# Patient Record
Sex: MALE
[De-identification: names, ages, dates, MRNs, and addresses within clinical notes are randomized per-mention and may not be internally consistent; named-entity substitution may affect disease eponyms.]

---

## 2017-01-13 ENCOUNTER — CHARTING TRANS (OUTPATIENT)
Dept: OTHER | Age: 67
End: 2017-01-13

## 2017-05-24 ENCOUNTER — CHARTING TRANS (OUTPATIENT)
Dept: OTHER | Age: 67
End: 2017-05-24

## 2017-08-17 ENCOUNTER — CHARTING TRANS (OUTPATIENT)
Dept: OTHER | Age: 67
End: 2017-08-17

## 2018-01-19 PROBLEM — N40.0 BENIGN PROSTATIC HYPERPLASIA WITHOUT LOWER URINARY TRACT SYMPTOMS: Status: ACTIVE | Noted: 2018-01-19

## 2018-01-19 PROBLEM — I10 ESSENTIAL (PRIMARY) HYPERTENSION: Status: ACTIVE | Noted: 2018-01-19

## 2018-01-19 PROBLEM — E78.00 HYPERCHOLESTEROLEMIA: Status: ACTIVE | Noted: 2018-01-19

## 2018-01-19 PROBLEM — E11.9 TYPE 2 DIABETES MELLITUS WITHOUT COMPLICATION, WITHOUT LONG-TERM CURRENT USE OF INSULIN (HCC): Status: ACTIVE | Noted: 2018-01-19

## 2018-03-22 PROCEDURE — 81003 URINALYSIS AUTO W/O SCOPE: CPT | Performed by: INTERNAL MEDICINE

## 2018-07-03 PROBLEM — L23.7 POISON IVY DERMATITIS: Status: ACTIVE | Noted: 2018-07-03

## 2018-07-31 PROCEDURE — 82043 UR ALBUMIN QUANTITATIVE: CPT | Performed by: INTERNAL MEDICINE

## 2018-07-31 PROCEDURE — 82570 ASSAY OF URINE CREATININE: CPT | Performed by: INTERNAL MEDICINE

## 2018-08-20 ENCOUNTER — CHARTING TRANS (OUTPATIENT)
Dept: OTHER | Age: 68
End: 2018-08-20

## 2018-11-03 VITALS
HEART RATE: 63 BPM | RESPIRATION RATE: 16 BRPM | TEMPERATURE: 98.3 F | WEIGHT: 190.56 LBS | DIASTOLIC BLOOD PRESSURE: 73 MMHG | BODY MASS INDEX: 28.23 KG/M2 | HEIGHT: 69 IN | SYSTOLIC BLOOD PRESSURE: 108 MMHG

## 2018-11-03 VITALS
DIASTOLIC BLOOD PRESSURE: 83 MMHG | WEIGHT: 189.13 LBS | RESPIRATION RATE: 16 BRPM | BODY MASS INDEX: 30.4 KG/M2 | SYSTOLIC BLOOD PRESSURE: 120 MMHG | TEMPERATURE: 98 F | HEIGHT: 66 IN | HEART RATE: 71 BPM

## 2018-11-05 VITALS
DIASTOLIC BLOOD PRESSURE: 84 MMHG | SYSTOLIC BLOOD PRESSURE: 148 MMHG | WEIGHT: 188 LBS | HEIGHT: 66 IN | HEART RATE: 76 BPM | BODY MASS INDEX: 30.22 KG/M2 | RESPIRATION RATE: 18 BRPM | TEMPERATURE: 97.8 F

## 2018-11-27 VITALS
BODY MASS INDEX: 29.82 KG/M2 | HEIGHT: 66 IN | RESPIRATION RATE: 16 BRPM | WEIGHT: 185.52 LBS | HEART RATE: 88 BPM | TEMPERATURE: 98.7 F | DIASTOLIC BLOOD PRESSURE: 78 MMHG | SYSTOLIC BLOOD PRESSURE: 108 MMHG

## 2019-04-09 ENCOUNTER — TELEPHONE (OUTPATIENT)
Dept: SCHEDULING | Age: 69
End: 2019-04-09

## 2019-08-27 PROBLEM — R97.20 ELEVATED PSA: Status: ACTIVE | Noted: 2019-08-27

## 2019-10-24 PROBLEM — M75.51 BURSITIS OF RIGHT SHOULDER: Status: ACTIVE | Noted: 2019-10-24

## 2020-08-09 LAB — AMB EXT COVID-19 RESULT: DETECTED

## 2020-08-14 ENCOUNTER — APPOINTMENT (OUTPATIENT)
Dept: GENERAL RADIOLOGY | Facility: HOSPITAL | Age: 70
DRG: 177 | End: 2020-08-14
Attending: EMERGENCY MEDICINE
Payer: MEDICARE

## 2020-08-14 ENCOUNTER — HOSPITAL ENCOUNTER (INPATIENT)
Facility: HOSPITAL | Age: 70
LOS: 7 days | Discharge: HOME | DRG: 177 | End: 2020-08-21
Attending: EMERGENCY MEDICINE | Admitting: INTERNAL MEDICINE
Payer: MEDICARE

## 2020-08-14 DIAGNOSIS — U07.1 COVID-19 VIRUS INFECTION: Primary | ICD-10-CM

## 2020-08-14 LAB
ALBUMIN SERPL-MCNC: 2.8 G/DL (ref 3.4–5)
ALBUMIN/GLOB SERPL: 0.7 {RATIO} (ref 1–2)
ALLENS TEST: POSITIVE
ALP LIVER SERPL-CCNC: 77 U/L (ref 45–117)
ALT SERPL-CCNC: 24 U/L (ref 16–61)
ANION GAP SERPL CALC-SCNC: 10 MMOL/L (ref 0–18)
ANTIBODY SCREEN: NEGATIVE
ARTERIAL BLD GAS O2 SATURATION: 94 % (ref 92–100)
ARTERIAL BLOOD GAS BASE EXCESS: -2.2 MMOL/L (ref ?–2)
ARTERIAL BLOOD GAS HCO3: 21.1 MEQ/L (ref 22–26)
ARTERIAL BLOOD GAS PCO2: 33 MM HG (ref 35–45)
ARTERIAL BLOOD GAS PH: 7.42 (ref 7.35–7.45)
ARTERIAL BLOOD GAS PO2: 74 MM HG (ref 80–105)
AST SERPL-CCNC: 20 U/L (ref 15–37)
BASOPHILS # BLD AUTO: 0.01 X10(3) UL (ref 0–0.2)
BASOPHILS NFR BLD AUTO: 0.1 %
BILIRUB SERPL-MCNC: 0.7 MG/DL (ref 0.1–2)
BILIRUB UR QL STRIP.AUTO: NEGATIVE
BUN BLD-MCNC: 12 MG/DL (ref 7–18)
BUN/CREAT SERPL: 13.3 (ref 10–20)
CALCIUM BLD-MCNC: 9 MG/DL (ref 8.5–10.1)
CALCULATED O2 SATURATION: 94 % (ref 92–100)
CARBOXYHEMOGLOBIN: 1.1 % SAT (ref 0–3)
CHLORIDE SERPL-SCNC: 98 MMOL/L (ref 98–112)
CK SERPL-CCNC: 142 U/L (ref 39–308)
CLARITY UR REFRACT.AUTO: CLEAR
CO2 SERPL-SCNC: 21 MMOL/L (ref 21–32)
COLOR UR AUTO: YELLOW
CREAT BLD-MCNC: 0.9 MG/DL (ref 0.7–1.3)
CRP SERPL-MCNC: 31.1 MG/DL (ref ?–0.3)
D-DIMER: 1.73 UG/ML FEU (ref ?–0.69)
DEPRECATED HBV CORE AB SER IA-ACNC: 143.6 NG/ML (ref 30–530)
DEPRECATED RDW RBC AUTO: 40.9 FL (ref 35.1–46.3)
EOSINOPHIL # BLD AUTO: 0 X10(3) UL (ref 0–0.7)
EOSINOPHIL NFR BLD AUTO: 0 %
ERYTHROCYTE [DISTWIDTH] IN BLOOD BY AUTOMATED COUNT: 12.3 % (ref 11–15)
GLOBULIN PLAS-MCNC: 4.3 G/DL (ref 2.8–4.4)
GLUCOSE BLD-MCNC: 305 MG/DL (ref 70–99)
GLUCOSE BLD-MCNC: 309 MG/DL (ref 70–99)
GLUCOSE BLD-MCNC: 344 MG/DL (ref 70–99)
GLUCOSE UR STRIP.AUTO-MCNC: >=500 MG/DL
HCT VFR BLD AUTO: 37.6 % (ref 39–53)
HGB BLD-MCNC: 12.9 G/DL (ref 13–17.5)
IMM GRANULOCYTES # BLD AUTO: 0.02 X10(3) UL (ref 0–1)
IMM GRANULOCYTES NFR BLD: 0.2 %
IONIZED CALCIUM: 1.18 MMOL/L (ref 1.12–1.32)
KETONES UR STRIP.AUTO-MCNC: 20 MG/DL
L/M: 15 L/MIN
LACTIC ACID ARTERIAL: 2.4 MMOL/L (ref 0.5–2)
LDH SERPL L TO P-CCNC: 207 U/L
LEUKOCYTE ESTERASE UR QL STRIP.AUTO: NEGATIVE
LYMPHOCYTES # BLD AUTO: 0.73 X10(3) UL (ref 1–4)
LYMPHOCYTES NFR BLD AUTO: 6.6 %
M PROTEIN MFR SERPL ELPH: 7.1 G/DL (ref 6.4–8.2)
MCH RBC QN AUTO: 31.2 PG (ref 26–34)
MCHC RBC AUTO-ENTMCNC: 34.3 G/DL (ref 31–37)
MCV RBC AUTO: 90.8 FL (ref 80–100)
METHEMOGLOBIN: 0.6 % SAT (ref 0.4–1.5)
MONOCYTES # BLD AUTO: 0.51 X10(3) UL (ref 0.1–1)
MONOCYTES NFR BLD AUTO: 4.6 %
NEUTROPHILS # BLD AUTO: 9.78 X10 (3) UL (ref 1.5–7.7)
NEUTROPHILS # BLD AUTO: 9.78 X10(3) UL (ref 1.5–7.7)
NEUTROPHILS NFR BLD AUTO: 88.5 %
NITRITE UR QL STRIP.AUTO: NEGATIVE
NT-PROBNP SERPL-MCNC: 367 PG/ML (ref ?–125)
OSMOLALITY SERPL CALC.SUM OF ELEC: 279 MOSM/KG (ref 275–295)
PATIENT TEMPERATURE: 100.2 F
PH UR STRIP.AUTO: 5 [PH] (ref 4.5–8)
PLATELET # BLD AUTO: 184 10(3)UL (ref 150–450)
POTASSIUM BLOOD GAS: 4.1 MMOL/L (ref 3.6–5.1)
POTASSIUM SERPL-SCNC: 4.1 MMOL/L (ref 3.5–5.1)
PROCALCITONIN SERPL-MCNC: 0.36 NG/ML (ref ?–0.16)
PROT UR STRIP.AUTO-MCNC: 100 MG/DL
RBC # BLD AUTO: 4.14 X10(6)UL (ref 3.8–5.8)
RH BLOOD TYPE: POSITIVE
SODIUM BLOOD GAS: 132 MMOL/L (ref 136–144)
SODIUM SERPL-SCNC: 129 MMOL/L (ref 136–145)
SP GR UR STRIP.AUTO: 1.03 (ref 1–1.03)
TOTAL HEMOGLOBIN: 13.6 G/DL (ref 12.6–17.4)
TROPONIN I SERPL-MCNC: <0.045 NG/ML (ref ?–0.04)
TROPONIN I SERPL-MCNC: <0.045 NG/ML (ref ?–0.04)
UROBILINOGEN UR STRIP.AUTO-MCNC: <2 MG/DL
WBC # BLD AUTO: 11.1 X10(3) UL (ref 4–11)

## 2020-08-14 PROCEDURE — 3E0333Z INTRODUCTION OF ANTI-INFLAMMATORY INTO PERIPHERAL VEIN, PERCUTANEOUS APPROACH: ICD-10-PCS | Performed by: EMERGENCY MEDICINE

## 2020-08-14 PROCEDURE — 71045 X-RAY EXAM CHEST 1 VIEW: CPT | Performed by: EMERGENCY MEDICINE

## 2020-08-14 PROCEDURE — XW033E5 INTRODUCTION OF REMDESIVIR ANTI-INFECTIVE INTO PERIPHERAL VEIN, PERCUTANEOUS APPROACH, NEW TECHNOLOGY GROUP 5: ICD-10-PCS | Performed by: INTERNAL MEDICINE

## 2020-08-14 PROCEDURE — 99291 CRITICAL CARE FIRST HOUR: CPT | Performed by: HOSPITALIST

## 2020-08-14 RX ORDER — AMLODIPINE BESYLATE 10 MG/1
40 TABLET ORAL DAILY
COMMUNITY
End: 2020-08-14 | Stop reason: CLARIF

## 2020-08-14 RX ORDER — ASCORBIC ACID 500 MG
500 TABLET ORAL 2 TIMES DAILY
Status: DISCONTINUED | OUTPATIENT
Start: 2020-08-14 | End: 2020-08-21

## 2020-08-14 RX ORDER — METOCLOPRAMIDE HYDROCHLORIDE 5 MG/ML
10 INJECTION INTRAMUSCULAR; INTRAVENOUS EVERY 8 HOURS PRN
Status: DISCONTINUED | OUTPATIENT
Start: 2020-08-14 | End: 2020-08-21

## 2020-08-14 RX ORDER — ONDANSETRON 2 MG/ML
4 INJECTION INTRAMUSCULAR; INTRAVENOUS EVERY 6 HOURS PRN
Status: DISCONTINUED | OUTPATIENT
Start: 2020-08-14 | End: 2020-08-21

## 2020-08-14 RX ORDER — TAMSULOSIN HYDROCHLORIDE 0.4 MG/1
0.8 CAPSULE ORAL DAILY
COMMUNITY

## 2020-08-14 RX ORDER — POLYETHYLENE GLYCOL 3350 17 G/17G
17 POWDER, FOR SOLUTION ORAL DAILY PRN
Status: DISCONTINUED | OUTPATIENT
Start: 2020-08-14 | End: 2020-08-21

## 2020-08-14 RX ORDER — DEXAMETHASONE SODIUM PHOSPHATE 4 MG/ML
6 VIAL (ML) INJECTION DAILY
Status: DISCONTINUED | OUTPATIENT
Start: 2020-08-15 | End: 2020-08-16

## 2020-08-14 RX ORDER — ENOXAPARIN SODIUM 100 MG/ML
40 INJECTION SUBCUTANEOUS DAILY
Status: DISCONTINUED | OUTPATIENT
Start: 2020-08-14 | End: 2020-08-15

## 2020-08-14 RX ORDER — SODIUM PHOSPHATE, DIBASIC AND SODIUM PHOSPHATE, MONOBASIC 7; 19 G/133ML; G/133ML
1 ENEMA RECTAL ONCE AS NEEDED
Status: DISCONTINUED | OUTPATIENT
Start: 2020-08-14 | End: 2020-08-21

## 2020-08-14 RX ORDER — AMLODIPINE BESYLATE AND BENAZEPRIL HYDROCHLORIDE 5; 40 MG/1; MG/1
1 CAPSULE ORAL DAILY
COMMUNITY
End: 2020-10-07 | Stop reason: CLARIF

## 2020-08-14 RX ORDER — ACETAMINOPHEN 325 MG/1
650 TABLET ORAL EVERY 6 HOURS PRN
Status: DISCONTINUED | OUTPATIENT
Start: 2020-08-14 | End: 2020-08-21

## 2020-08-14 RX ORDER — ENOXAPARIN SODIUM 100 MG/ML
40 INJECTION SUBCUTANEOUS DAILY
Status: DISCONTINUED | OUTPATIENT
Start: 2020-08-15 | End: 2020-08-14

## 2020-08-14 RX ORDER — DEXTROSE MONOHYDRATE 25 G/50ML
50 INJECTION, SOLUTION INTRAVENOUS
Status: DISCONTINUED | OUTPATIENT
Start: 2020-08-14 | End: 2020-08-21

## 2020-08-14 RX ORDER — BISACODYL 10 MG
10 SUPPOSITORY, RECTAL RECTAL
Status: DISCONTINUED | OUTPATIENT
Start: 2020-08-14 | End: 2020-08-21

## 2020-08-14 RX ORDER — ERGOCALCIFEROL 1.25 MG/1
50000 CAPSULE ORAL
Status: DISCONTINUED | OUTPATIENT
Start: 2020-08-14 | End: 2020-08-21

## 2020-08-14 RX ORDER — DEXAMETHASONE SODIUM PHOSPHATE 4 MG/ML
6 VIAL (ML) INJECTION ONCE
Status: COMPLETED | OUTPATIENT
Start: 2020-08-14 | End: 2020-08-14

## 2020-08-14 NOTE — ED INITIAL ASSESSMENT (HPI)
Pt arrives from [facility] immediate care for worsening COVID 19 symptoms ~ dx was positive on Sunday ~ pt reports increasing shortness of breath and reports that oxygen saturation was 88% on room air there which came up to 92-95% with 2L of oxygen.  Pt

## 2020-08-15 ENCOUNTER — APPOINTMENT (OUTPATIENT)
Dept: ULTRASOUND IMAGING | Facility: HOSPITAL | Age: 70
DRG: 177 | End: 2020-08-15
Attending: INTERNAL MEDICINE
Payer: MEDICARE

## 2020-08-15 LAB
ALBUMIN SERPL-MCNC: 2.4 G/DL (ref 3.4–5)
ALBUMIN/GLOB SERPL: 0.6 {RATIO} (ref 1–2)
ALP LIVER SERPL-CCNC: 74 U/L (ref 45–117)
ALT SERPL-CCNC: 27 U/L (ref 16–61)
ANION GAP SERPL CALC-SCNC: 6 MMOL/L (ref 0–18)
AST SERPL-CCNC: 20 U/L (ref 15–37)
BASOPHILS # BLD AUTO: 0.01 X10(3) UL (ref 0–0.2)
BASOPHILS NFR BLD AUTO: 0.1 %
BILIRUB SERPL-MCNC: 0.4 MG/DL (ref 0.1–2)
BUN BLD-MCNC: 16 MG/DL (ref 7–18)
BUN/CREAT SERPL: 20.5 (ref 10–20)
CALCIUM BLD-MCNC: 8.6 MG/DL (ref 8.5–10.1)
CHLORIDE SERPL-SCNC: 103 MMOL/L (ref 98–112)
CO2 SERPL-SCNC: 25 MMOL/L (ref 21–32)
CREAT BLD-MCNC: 0.78 MG/DL (ref 0.7–1.3)
CRP SERPL-MCNC: 32.5 MG/DL (ref ?–0.3)
D-DIMER: 1.71 UG/ML FEU (ref ?–0.69)
DEPRECATED HBV CORE AB SER IA-ACNC: 234.5 NG/ML (ref 30–530)
DEPRECATED RDW RBC AUTO: 41.2 FL (ref 35.1–46.3)
EOSINOPHIL # BLD AUTO: 0 X10(3) UL (ref 0–0.7)
EOSINOPHIL NFR BLD AUTO: 0 %
ERYTHROCYTE [DISTWIDTH] IN BLOOD BY AUTOMATED COUNT: 12.4 % (ref 11–15)
EST. AVERAGE GLUCOSE BLD GHB EST-MCNC: 229 MG/DL (ref 68–126)
GLOBULIN PLAS-MCNC: 4.3 G/DL (ref 2.8–4.4)
GLUCOSE BLD-MCNC: 293 MG/DL (ref 70–99)
GLUCOSE BLD-MCNC: 293 MG/DL (ref 70–99)
GLUCOSE BLD-MCNC: 302 MG/DL (ref 70–99)
GLUCOSE BLD-MCNC: 309 MG/DL (ref 70–99)
GLUCOSE BLD-MCNC: 314 MG/DL (ref 70–99)
HAV IGM SER QL: 2.1 MG/DL (ref 1.6–2.6)
HBA1C MFR BLD HPLC: 9.6 % (ref ?–5.7)
HCT VFR BLD AUTO: 35.8 % (ref 39–53)
HGB BLD-MCNC: 12.2 G/DL (ref 13–17.5)
IMM GRANULOCYTES # BLD AUTO: 0.04 X10(3) UL (ref 0–1)
IMM GRANULOCYTES NFR BLD: 0.4 %
LDH SERPL L TO P-CCNC: 222 U/L
LYMPHOCYTES # BLD AUTO: 0.56 X10(3) UL (ref 1–4)
LYMPHOCYTES NFR BLD AUTO: 6.1 %
M PROTEIN MFR SERPL ELPH: 6.7 G/DL (ref 6.4–8.2)
MCH RBC QN AUTO: 31.4 PG (ref 26–34)
MCHC RBC AUTO-ENTMCNC: 34.1 G/DL (ref 31–37)
MCV RBC AUTO: 92.3 FL (ref 80–100)
MONOCYTES # BLD AUTO: 0.25 X10(3) UL (ref 0.1–1)
MONOCYTES NFR BLD AUTO: 2.7 %
NEUTROPHILS # BLD AUTO: 8.31 X10 (3) UL (ref 1.5–7.7)
NEUTROPHILS # BLD AUTO: 8.31 X10(3) UL (ref 1.5–7.7)
NEUTROPHILS NFR BLD AUTO: 90.7 %
OSMOLALITY SERPL CALC.SUM OF ELEC: 290 MOSM/KG (ref 275–295)
PLATELET # BLD AUTO: 194 10(3)UL (ref 150–450)
POTASSIUM SERPL-SCNC: 4.4 MMOL/L (ref 3.5–5.1)
RBC # BLD AUTO: 3.88 X10(6)UL (ref 3.8–5.8)
SODIUM SERPL-SCNC: 134 MMOL/L (ref 136–145)
TROPONIN I SERPL-MCNC: <0.045 NG/ML (ref ?–0.04)
WBC # BLD AUTO: 9.2 X10(3) UL (ref 4–11)

## 2020-08-15 PROCEDURE — 93970 EXTREMITY STUDY: CPT | Performed by: INTERNAL MEDICINE

## 2020-08-15 PROCEDURE — XW13325 TRANSFUSION OF CONVALESCENT PLASMA (NONAUTOLOGOUS) INTO PERIPHERAL VEIN, PERCUTANEOUS APPROACH, NEW TECHNOLOGY GROUP 5: ICD-10-PCS | Performed by: HOSPITALIST

## 2020-08-15 RX ORDER — TAMSULOSIN HYDROCHLORIDE 0.4 MG/1
0.8 CAPSULE ORAL DAILY
Status: DISCONTINUED | OUTPATIENT
Start: 2020-08-15 | End: 2020-08-21

## 2020-08-15 RX ORDER — FINASTERIDE 5 MG/1
5 TABLET, FILM COATED ORAL DAILY
Status: DISCONTINUED | OUTPATIENT
Start: 2020-08-16 | End: 2020-08-21

## 2020-08-15 RX ORDER — MORPHINE SULFATE 4 MG/ML
INJECTION, SOLUTION INTRAMUSCULAR; INTRAVENOUS
Status: COMPLETED
Start: 2020-08-15 | End: 2020-08-15

## 2020-08-15 RX ORDER — BENZONATATE 100 MG/1
100 CAPSULE ORAL 3 TIMES DAILY PRN
Status: DISCONTINUED | OUTPATIENT
Start: 2020-08-15 | End: 2020-08-21

## 2020-08-15 RX ORDER — MORPHINE SULFATE 4 MG/ML
1 INJECTION, SOLUTION INTRAMUSCULAR; INTRAVENOUS EVERY 2 HOUR PRN
Status: DISCONTINUED | OUTPATIENT
Start: 2020-08-15 | End: 2020-08-21

## 2020-08-15 RX ORDER — MORPHINE SULFATE 4 MG/ML
2 INJECTION, SOLUTION INTRAMUSCULAR; INTRAVENOUS EVERY 2 HOUR PRN
Status: DISCONTINUED | OUTPATIENT
Start: 2020-08-15 | End: 2020-08-21

## 2020-08-15 RX ORDER — MORPHINE SULFATE 4 MG/ML
2 INJECTION, SOLUTION INTRAMUSCULAR; INTRAVENOUS ONCE
Status: COMPLETED | OUTPATIENT
Start: 2020-08-15 | End: 2020-08-15

## 2020-08-15 RX ORDER — ROSUVASTATIN CALCIUM 10 MG/1
10 TABLET, COATED ORAL NIGHTLY
Status: DISCONTINUED | OUTPATIENT
Start: 2020-08-15 | End: 2020-08-21

## 2020-08-15 RX ORDER — MORPHINE SULFATE 4 MG/ML
0.5 INJECTION, SOLUTION INTRAMUSCULAR; INTRAVENOUS EVERY 2 HOUR PRN
Status: DISCONTINUED | OUTPATIENT
Start: 2020-08-15 | End: 2020-08-21

## 2020-08-15 RX ORDER — TETRAHYDROZOLINE HCL 0.05 %
1 DROPS OPHTHALMIC (EYE) 4 TIMES DAILY PRN
Status: DISCONTINUED | OUTPATIENT
Start: 2020-08-15 | End: 2020-08-21

## 2020-08-15 RX ORDER — ENOXAPARIN SODIUM 100 MG/ML
1 INJECTION SUBCUTANEOUS EVERY 12 HOURS SCHEDULED
Status: DISCONTINUED | OUTPATIENT
Start: 2020-08-15 | End: 2020-08-20

## 2020-08-15 NOTE — PLAN OF CARE
Transferred from the floor to ICU at 2215H last night post RRT because of desaturation, O2 sat was at 80's %, he was just admitted from ER that time.  To ICU for airway monitoring, he is in 100% NRM when he came in, tachypneic, shallow breathing, but not in

## 2020-08-15 NOTE — PROGRESS NOTES
Pt A0x4. Maintaining 02 sats on 3L via NC. Dry nagging cough noted. Appears that coughing is made worse by talking. +SOB with coughing episodes. Robitussin/Tessalon given. VSS. Tolerating diet. Voiding freely. Denies pain. Pt declined sitting up in chair.

## 2020-08-15 NOTE — PROGRESS NOTES
RRT Note    Patient admitted for acute hypoxic respiratory failure d/t COVID19. Patient arrived to the floor on 2L and then became hypoxic. Patient placed in prone position, oxygen increased to 15L.     Patient seen at bedside, tachypneic in conversation, t

## 2020-08-15 NOTE — CONSULTS
INFECTIOUS DISEASE CONSULTATION    Amelia Pascual Patient Status:  Inpatient    1950 MRN [id number]   [facility] 4SW-A Attending Beth Pulliam MD   [facility] Day # 1 PCP Bradley Bowens, sodium chloride 0.9% 100 mL MBP/ADD-vantage, 1 g, Intravenous, Q24H  •  azithromycin (ZITHROMAX) 500 mg in sodium chloride 0.9% 250 mL IVPB, 500 mg, Intravenous, Q24H  •  dexamethasone Sodium Phosphate (DECADRON) 4 MG/ML injection 6 mg, 6 mg, Intravenous, daily., Disp: , Rfl:   tamsulosin (FLOMAX) cap, Take 0.8 mg by mouth daily. Take half hour after meal, Disp: , Rfl:   benzonatate 200 MG Oral Cap, Take 1 capsule (200 mg total) by mouth 3 (three) times daily as needed for cough. , Disp: 30 capsule, Rfl: 0 2. 8* 2.4*   * 134*   K 4.1 4.4   CL 98 103   CO2 21.0 25.0   ALKPHO 77 74   AST 20 20   ALT 24 27   BILT 0.7 0.4   TP 7.1 6.7         Lab Results   Component Value Date    DDIMER 1.71 08/15/2020    CRP 32.50 08/15/2020    MG 2.1 08/15/2020    TROP <0

## 2020-08-15 NOTE — CONSULTS
[facility] PULMONARY/CRITICAL CARE CONSULTATION       HPI: Lilly Vinson is a 71year old male with a history of DM2, HTN, hyperlipidemia  who presented to the [facility] today with cough and dyspnea for the past 8 days. Pt tested positive for COVID on 8/7.  Armando dexamethasone Sodium Phosphate (DECADRON) 4 MG/ML injection 6 mg, 6 mg, Intravenous, Once        ALLERGIES      No Known Allergies    SOCIAL HISTORY      Social History    Socioeconomic History      Marital status:       Spouse name: Not on file Resp: (!) 28 (!) 28 24 22   Temp: 99.2 °F (37.3 °C)      TempSrc: Oral      SpO2: 93% 94%     Weight: 175 lb (79.4 kg)      Height: 5' 6\" (1.676 m)          Oxygen Therapy  SpO2: 94 %  O2 Device: Nasal cannula  O2 Flow Rate (L/min): 4 L/min    Gen - Alert

## 2020-08-15 NOTE — H&P
[facility] Hospitalist H&P       CC: Patient presents with:  Dyspnea FAMILIA SOB       PCP: Vern Escobar MD    History of Present Illness:  Mr. Larry Davila is a 70 yo male with PMH of BPH, DM type II, HTN, HLD who presented to the ED with shortness of breath.  Patient 90 tablet, Rfl: 1          Soc Hx  Social History    Tobacco Use      Smoking status: Never Smoker      Smokeless tobacco: Never Used    Alcohol use: No       Fam Hx  Family History   Problem Relation Age of Onset   • Diabetes Mother    • Diabetes Brother bilateral interstitial abnormalities    Radiology: Xr Chest Ap Portable  (cpt=71045)    Result Date: 8/14/2020  PROCEDURE:  XR CHEST AP PORTABLE  (CPT=71045)  TECHNIQUE:  AP chest radiograph was obtained. COMPARISON:  None.   INDICATIONS:  increasing SOB ~ failure    # Mild Anemia  - normocytic  - monitor  - no active signs of bleeding    # DM type II  - SSC and accuchecks  - hold home oral agents    # HTN  - hold home BP meds  - BP on low side this AM  - monitor    # HLD  - continue home statin    # BPH  -

## 2020-08-15 NOTE — PROGRESS NOTES
[facility] PULMONARY/CRITICAL CARE    S: Shortly after I saw the patient yesterday, he developed worsening dyspnea/hypoxemia and was transferred to the ICU. He's feeling better today, has been proning overnight.      Meds:  • cefTRIAXone  1 g Intravenous Q24H   • 32.50*     --          Imaging reviewed    Assessment and Plan    1.  Acute hypoxemic respiratory failure - secondary to COVID 19 pneumonia, cannot r/o superimposed bacterial pneumonia  -wean O2 as able -- if needed we could use NIV + helmet interface

## 2020-08-15 NOTE — PROGRESS NOTES
Received pt from ER on 2L NC, pt O2 sats at 80%, RR 40s. Placed in prone position and placed on 15L NRB. Rapid response called, MD at bedside, verbal orders to tranfer to ICU.

## 2020-08-15 NOTE — ED PROVIDER NOTES
Patient Seen in: [facility] Emergency Department      History   Patient presents with:  Dyspnea FAMILIA SOB    Stated Complaint: increasing SOB ~ COVID positive     HPI        Patient here with worsening shortness of breath over the last week.   He has k equal, round, and reactive to light. Neck: Normal range of motion. Neck supple. No JVD present. Cardiovascular: Normal rate and regular rhythm. Pulmonary/Chest: Effort normal and breath sounds normal. No stridor. Abdominal: Soft.  There is no ten FERRITIN - Normal   TROPONIN I - Normal   TROPONIN I - Normal   CK CREATINE KINASE (NOT CREATININE) - Normal   CBC WITH DIFFERENTIAL WITH PLATELET    Narrative: The following orders were created for panel order CBC WITH DIFFERENTIAL WITH PLATELET.   P on 8/14/2020 at 7:38 PM     Finalized by (CST): Cornelia Macias MD on 8/14/2020 at 7:39 PM         Blood cultures pending. UA clean    CRP markedly elevated, remainder of inflammatory markers elevated but not very impressive.       MDM         Patient

## 2020-08-16 LAB
ALBUMIN SERPL-MCNC: 2.6 G/DL (ref 3.4–5)
ALBUMIN/GLOB SERPL: 0.6 {RATIO} (ref 1–2)
ALP LIVER SERPL-CCNC: 72 U/L (ref 45–117)
ALT SERPL-CCNC: 42 U/L (ref 16–61)
ANION GAP SERPL CALC-SCNC: 10 MMOL/L (ref 0–18)
AST SERPL-CCNC: 26 U/L (ref 15–37)
BASOPHILS # BLD AUTO: 0 X10(3) UL (ref 0–0.2)
BASOPHILS NFR BLD AUTO: 0 %
BILIRUB SERPL-MCNC: 0.4 MG/DL (ref 0.1–2)
BLOOD TYPE BARCODE: 5100
BUN BLD-MCNC: 27 MG/DL (ref 7–18)
BUN/CREAT SERPL: 32.5 (ref 10–20)
CALCIUM BLD-MCNC: 9.2 MG/DL (ref 8.5–10.1)
CHLORIDE SERPL-SCNC: 101 MMOL/L (ref 98–112)
CO2 SERPL-SCNC: 23 MMOL/L (ref 21–32)
CREAT BLD-MCNC: 0.83 MG/DL (ref 0.7–1.3)
CRP SERPL-MCNC: 23.8 MG/DL (ref ?–0.3)
D-DIMER: 1.43 UG/ML FEU (ref ?–0.69)
DEPRECATED HBV CORE AB SER IA-ACNC: 289.4 NG/ML (ref 30–530)
DEPRECATED RDW RBC AUTO: 43.4 FL (ref 35.1–46.3)
EOSINOPHIL # BLD AUTO: 0 X10(3) UL (ref 0–0.7)
EOSINOPHIL NFR BLD AUTO: 0 %
ERYTHROCYTE [DISTWIDTH] IN BLOOD BY AUTOMATED COUNT: 12.5 % (ref 11–15)
GLOBULIN PLAS-MCNC: 4.1 G/DL (ref 2.8–4.4)
GLUCOSE BLD-MCNC: 243 MG/DL (ref 70–99)
GLUCOSE BLD-MCNC: 295 MG/DL (ref 70–99)
GLUCOSE BLD-MCNC: 324 MG/DL (ref 70–99)
GLUCOSE BLD-MCNC: 367 MG/DL (ref 70–99)
GLUCOSE BLD-MCNC: 400 MG/DL (ref 70–99)
HCT VFR BLD AUTO: 37.6 % (ref 39–53)
HGB BLD-MCNC: 12.3 G/DL (ref 13–17.5)
IMM GRANULOCYTES # BLD AUTO: 0.03 X10(3) UL (ref 0–1)
IMM GRANULOCYTES NFR BLD: 0.4 %
INR BLD: 1.2 (ref 0.89–1.11)
LDH SERPL L TO P-CCNC: 204 U/L
LYMPHOCYTES # BLD AUTO: 0.89 X10(3) UL (ref 1–4)
LYMPHOCYTES NFR BLD AUTO: 11.4 %
M PROTEIN MFR SERPL ELPH: 6.7 G/DL (ref 6.4–8.2)
MCH RBC QN AUTO: 31.1 PG (ref 26–34)
MCHC RBC AUTO-ENTMCNC: 32.7 G/DL (ref 31–37)
MCV RBC AUTO: 95.2 FL (ref 80–100)
MONOCYTES # BLD AUTO: 0.31 X10(3) UL (ref 0.1–1)
MONOCYTES NFR BLD AUTO: 4 %
NEUTROPHILS # BLD AUTO: 6.59 X10 (3) UL (ref 1.5–7.7)
NEUTROPHILS # BLD AUTO: 6.59 X10(3) UL (ref 1.5–7.7)
NEUTROPHILS NFR BLD AUTO: 84.2 %
OSMOLALITY SERPL CALC.SUM OF ELEC: 296 MOSM/KG (ref 275–295)
PLATELET # BLD AUTO: 250 10(3)UL (ref 150–450)
POTASSIUM SERPL-SCNC: 4.5 MMOL/L (ref 3.5–5.1)
PSA SERPL DL<=0.01 NG/ML-MCNC: 15.6 SECONDS (ref 12.4–14.6)
RBC # BLD AUTO: 3.95 X10(6)UL (ref 3.8–5.8)
SODIUM SERPL-SCNC: 134 MMOL/L (ref 136–145)
WBC # BLD AUTO: 7.8 X10(3) UL (ref 4–11)

## 2020-08-16 RX ORDER — ALPRAZOLAM 0.25 MG/1
0.25 TABLET ORAL 3 TIMES DAILY PRN
Status: DISCONTINUED | OUTPATIENT
Start: 2020-08-16 | End: 2020-08-19

## 2020-08-16 RX ORDER — DOCUSATE SODIUM 100 MG/1
100 CAPSULE, LIQUID FILLED ORAL 2 TIMES DAILY
Status: DISCONTINUED | OUTPATIENT
Start: 2020-08-16 | End: 2020-08-21

## 2020-08-16 NOTE — PROGRESS NOTES
[facility]  TEL: [phone]  FAX: [phone]    Janet Granados Patient Status:  Inpatient    1950 MRN [id number]   [facility] 4SW-A Attending Ky Reardon MD   [facility] Day # 2 PCP Suleiman Torres, 08/15/20  0440 08/16/20  0631   CRP 31.10* 32.50* 23.80*   CHICA 143.6 234.5 289.4    222 204   DDIMER 1.73* 1.71* 1.43*           Microbiology    Reviewed in EMR,   Hospital Encounter on 08/14/20   1.  BLOOD CULTURE     Status: None (Preliminary resul PATIENT STATED HISTORY: (As transcribed by Technologist)  Pt arrives from [address] [place] [address] care for worsening COVID 19  symptoms ~ dx was positive on Sunday ~ pt reports increasing shortness of  breath and reports that oxygen saturation was 88% on

## 2020-08-16 NOTE — PROGRESS NOTES
[facility] Pharmacy Note: Route Optimization for Dexamethasone     Patient is currently on dexamethasone  6 mg IV every 24 hours.    The patient meets the criteria to convert to the oral equivalent as established by the IV to Oral conversion protocol approved by t

## 2020-08-16 NOTE — PROGRESS NOTES
Able to sleep for 3  to 4 hours this morning, verbalized his frustrations that he is in the hospital, moral and emotional support given, asked that he wanted to eat breakfast, ordered food for today.

## 2020-08-16 NOTE — PLAN OF CARE
Alert and oriented x 4, tolerating proning treatment, still with dry hacking cough, unproductive cough noted, with SOB during exertion. O2 at 2L/NC ongoing, no desaturation noted.  Febrile again at the beginning of the shift, gave Tylenol PO as PRN for fev

## 2020-08-16 NOTE — PROGRESS NOTES
[facility] Hospitalist Progress Note     [facility]      SUBJECTIVE:  Fever to 100.6  Coughing is worse  Sitting up in chair this AM  Down to 1 L NC    OBJECTIVE:  Temp:  [98.6 °F (37 °C)-100.6 °F (38.1 °C)] 98.8 °F (37.1 °C)  Pulse:  [] 101  Resp: images of the vascular structures, Doppler spectral analysis, and color flow. Doppler imaging were performed.   The following veins were imaged bilaterally:  Common, deep, and superficial femoral, popliteal, sapheno-femoral junction, and posterior tibial v and patchy consolidations in the lungs may be related to atypical infectious process. This can also be seen with fluid overload.     Dictated by (CST): Oneida Strong MD on 8/14/2020 at 7:38 PM     Finalized by (CST): Oneida Strong MD on 8/14/2020 hydroxide (MILK OF MAGNESIA) 400 MG/5ML suspension 30 mL, 30 mL, Oral, Daily PRN  bisacodyl (DULCOLAX) rectal suppository 10 mg, 10 mg, Rectal, Daily PRN  Fleet Enema (FLEET) 7-19 GM/118ML enema 133 mL, 1 enema, Rectal, Once PRN  ondansetron HCl (ZOFRAN) i statin     # BPH  - continue home finasteride/flomax     Prophy:  DVT: lovenox     Dispo: admitted for COVID PNA     FULL CODE, confirmed with patient     Outpatient records reviewed confirming patient's medical history and medications.      Dylon Curtis

## 2020-08-16 NOTE — PROGRESS NOTES
Pt A0x4. Anxious. Expressing frustration regarding hospital stay and being out of normal routine. Reassurance and cares completed. Spoke w/ pt's family and updated. Prn Xanax ordered and given. Pt reports to be comfortable sitting up in the chair.  Maintain

## 2020-08-16 NOTE — PROGRESS NOTES
[facility] PULMONARY/CRITICAL CARE    S:  Pt had a fever to 100.6 last night. Pt says he feels worse. O2 requirements have improved, however. He c/o dyspnea and cough.      Meds:  • finasteride  5 mg Oral Daily   • Rosuvastatin Calcium  10 mg Oral Nightly   • tams 08/14/20  1725 08/14/20  2330   TROP <0.045  <0.045 <0.045     --        Recent Labs   Lab 08/14/20  1725   PCT 0.36*       Recent Labs   Lab 08/14/20  1725 08/15/20  0440 08/16/20  0631   CHICA 143.6 234.5  --     222  --    DDIMER 1.73* 1.71*

## 2020-08-17 LAB
ALBUMIN SERPL-MCNC: 2.4 G/DL (ref 3.4–5)
ALBUMIN/GLOB SERPL: 0.6 {RATIO} (ref 1–2)
ALP LIVER SERPL-CCNC: 68 U/L (ref 45–117)
ALT SERPL-CCNC: 84 U/L (ref 16–61)
ANION GAP SERPL CALC-SCNC: 7 MMOL/L (ref 0–18)
AST SERPL-CCNC: 44 U/L (ref 15–37)
ATRIAL RATE: 86 BPM
ATRIAL RATE: 86 BPM
BASOPHILS # BLD AUTO: 0 X10(3) UL (ref 0–0.2)
BASOPHILS NFR BLD AUTO: 0 %
BILIRUB SERPL-MCNC: 0.4 MG/DL (ref 0.1–2)
BUN BLD-MCNC: 29 MG/DL (ref 7–18)
BUN/CREAT SERPL: 31.9 (ref 10–20)
CALCIUM BLD-MCNC: 8.8 MG/DL (ref 8.5–10.1)
CHLORIDE SERPL-SCNC: 103 MMOL/L (ref 98–112)
CO2 SERPL-SCNC: 27 MMOL/L (ref 21–32)
CREAT BLD-MCNC: 0.91 MG/DL (ref 0.7–1.3)
CRP SERPL-MCNC: 10.9 MG/DL (ref ?–0.3)
D-DIMER: 0.74 UG/ML FEU (ref ?–0.69)
DEPRECATED HBV CORE AB SER IA-ACNC: 282.4 NG/ML (ref 30–530)
DEPRECATED RDW RBC AUTO: 44.4 FL (ref 35.1–46.3)
EOSINOPHIL # BLD AUTO: 0 X10(3) UL (ref 0–0.7)
EOSINOPHIL NFR BLD AUTO: 0 %
ERYTHROCYTE [DISTWIDTH] IN BLOOD BY AUTOMATED COUNT: 12.6 % (ref 11–15)
GLOBULIN PLAS-MCNC: 3.9 G/DL (ref 2.8–4.4)
GLUCOSE BLD-MCNC: 359 MG/DL (ref 70–99)
GLUCOSE BLD-MCNC: 379 MG/DL (ref 70–99)
GLUCOSE BLD-MCNC: 398 MG/DL (ref 70–99)
GLUCOSE BLD-MCNC: 445 MG/DL (ref 70–99)
HCT VFR BLD AUTO: 36.1 % (ref 39–53)
HGB BLD-MCNC: 12 G/DL (ref 13–17.5)
IMM GRANULOCYTES # BLD AUTO: 0.01 X10(3) UL (ref 0–1)
IMM GRANULOCYTES NFR BLD: 0.2 %
LDH SERPL L TO P-CCNC: 210 U/L
LYMPHOCYTES # BLD AUTO: 0.64 X10(3) UL (ref 1–4)
LYMPHOCYTES NFR BLD AUTO: 12.5 %
M PROTEIN MFR SERPL ELPH: 6.3 G/DL (ref 6.4–8.2)
MCH RBC QN AUTO: 32.5 PG (ref 26–34)
MCHC RBC AUTO-ENTMCNC: 33.2 G/DL (ref 31–37)
MCV RBC AUTO: 97.8 FL (ref 80–100)
MONOCYTES # BLD AUTO: 0.24 X10(3) UL (ref 0.1–1)
MONOCYTES NFR BLD AUTO: 4.7 %
NEUTROPHILS # BLD AUTO: 4.25 X10 (3) UL (ref 1.5–7.7)
NEUTROPHILS # BLD AUTO: 4.25 X10(3) UL (ref 1.5–7.7)
NEUTROPHILS NFR BLD AUTO: 82.6 %
OSMOLALITY SERPL CALC.SUM OF ELEC: 304 MOSM/KG (ref 275–295)
P AXIS: 29 DEGREES
P AXIS: 31 DEGREES
P-R INTERVAL: 236 MS
P-R INTERVAL: 242 MS
PLATELET # BLD AUTO: 165 10(3)UL (ref 150–450)
POTASSIUM SERPL-SCNC: 4.4 MMOL/L (ref 3.5–5.1)
Q-T INTERVAL: 392 MS
Q-T INTERVAL: 392 MS
QRS DURATION: 102 MS
QRS DURATION: 90 MS
QTC CALCULATION (BEZET): 469 MS
QTC CALCULATION (BEZET): 469 MS
R AXIS: -23 DEGREES
R AXIS: -43 DEGREES
RBC # BLD AUTO: 3.69 X10(6)UL (ref 3.8–5.8)
SODIUM SERPL-SCNC: 137 MMOL/L (ref 136–145)
T AXIS: 24 DEGREES
T AXIS: 27 DEGREES
VENTRICULAR RATE: 86 BPM
VENTRICULAR RATE: 86 BPM
WBC # BLD AUTO: 5.1 X10(3) UL (ref 4–11)

## 2020-08-17 NOTE — CM/SW NOTE
08/17/20 1400   CM/SW Referral Data   Referral Source Social Work (self-referral)   Reason for Referral Discharge planning   Informant Children  (dtr Amal)   Social History   Recreational Drug/Alcohol Use n   Major Changes Last 6 Months n   Domestic/Par

## 2020-08-17 NOTE — PROGRESS NOTES
08/17/20 1813   Clinical Encounter Type   Visited With Health care provider   Routine Visit Introduction  ( discussed with RN protocol for covid19, POA paperwork completion.  RN to [employer] instructions and confirm with second RN oversight)

## 2020-08-17 NOTE — PROGRESS NOTES
[address] [place] [address]  TEL: [phone]  FAX: [phone]    Lilly Vinson Patient Status:  Inpatient    1950 MRN [id number]   [facility] 4SW-A Attending Joanie Vu MD   [facility] Day # 3 PCP Jose Cruz Jhaveri, 282.4    204  --  210   DDIMER 1.71* 1.43* 0.74*  --            Microbiology    Reviewed in EMR,   Hospital Encounter on 08/14/20   1.  BLOOD CULTURE     Status: None (Preliminary result)    Collection Time: 08/14/20  5:42 PM   Result Value Ref Range

## 2020-08-17 NOTE — PLAN OF CARE
Received pt on 4L per NC. Able to wean to 2-3L. Up in chair this morning. Has strong, non-productive cough. States appetite is returning, sob is improving. Continued on therapeutic dosing Lovenox. Receiving po steroids and remdesivir for covid.  Insulin dos

## 2020-08-17 NOTE — DIETARY NOTE
[facility]    NUTRITION ASSESSMENT    Pt does not meet malnutrition criteria. NUTRITION DIAGNOSIS/PROBLEM:  Altered nutrition-related lab values related to physiological causes as evidenced by A1c of 9.6% and BG levels at 302-359 mg/dl.     Braulio Pittman signs of skin breakdown  4.  Maintain lean body mass    MEDICATIONS: Colace, Vit D, Insulin, Vit C    LABS: A1c 9.6%, -359 mg/dl    Pt is at moderate nutrition risk    FOLLOW-UP DATE: 8/21/2020    Antonio Reinoso MS, RDN, LDN  Clinical Dietitian  Page

## 2020-08-17 NOTE — PLAN OF CARE
On 4L O2 Hi flow the whole night, noted with tachypnea and shallow breathing during exertion, no respiratory distress noted. Gave due Melatonin last night, also gave Tylenol for pain, able to go to sleep by 2200H, no untoward events during the night.   No

## 2020-08-17 NOTE — PROGRESS NOTES
[facility] Hospitalist Progress Note     [facility]      SUBJECTIVE:  Feeling better  Blood sugars have been high  Afebrile    OBJECTIVE:  Temp:  [97.9 °F (36.6 °C)-98.6 °F (37 °C)] 97.9 °F (36.6 °C)  Pulse:  [52-93] 85  Resp:  [16-39] 18  BP: ()/(48 duplex ultrasound was used to evaluate the lower extremity venous system. B-mode two-dimensional images of the vascular structures, Doppler spectral analysis, and color flow. Doppler imaging were performed.   The following veins were imaged bilaterally:  C consolidations are identified. No pneumothorax. CONCLUSION:   Diffuse interstitial abnormalities and patchy consolidations in the lungs may be related to atypical infectious process. This can also be seen with fluid overload.     Dictated by (CST) Units, Oral, Q7 Days  Vitamin C tab 500 mg, 500 mg, Oral, BID  sodium chloride 0.9% IV bolus 500 mL, 500 mL, Intravenous, PRN  remdesivir 100 mg in sodium chloride 0.9% 250 mL IVPB, 100 mg, Intravenous, Q24H  acetaminophen (TYLENOL) tab 650 mg, 650 mg, Ora monitor     # Mild Anemia  - normocytic  - monitor  - no active signs of bleeding     # DM type II c/b hyperglycemia  - secondary to steroids  - SSC and accuchecks, add meal time coverage  - levemir 15 u in AM and 10 u in PM     # HTN  - hold home BP meds

## 2020-08-17 NOTE — PROGRESS NOTES
Critical Care Progress Note     Assessment / Plan:  1.  Acute hypoxemic respiratory failure - secondary to COVID 19 pneumonia  -wean O2 as able   -ID following  -dexamethasone  -remdesivir  -s/p convalescent plasma  -empiric antibiotics per ID, currently mo

## 2020-08-18 LAB
ALBUMIN SERPL-MCNC: 2.2 G/DL (ref 3.4–5)
ALBUMIN/GLOB SERPL: 0.6 {RATIO} (ref 1–2)
ALP LIVER SERPL-CCNC: 68 U/L (ref 45–117)
ALT SERPL-CCNC: 75 U/L (ref 16–61)
ANION GAP SERPL CALC-SCNC: 3 MMOL/L (ref 0–18)
AST SERPL-CCNC: 29 U/L (ref 15–37)
BASOPHILS # BLD AUTO: 0 X10(3) UL (ref 0–0.2)
BASOPHILS NFR BLD AUTO: 0 %
BILIRUB SERPL-MCNC: 0.2 MG/DL (ref 0.1–2)
BUN BLD-MCNC: 24 MG/DL (ref 7–18)
BUN/CREAT SERPL: 32.9 (ref 10–20)
CALCIUM BLD-MCNC: 8.6 MG/DL (ref 8.5–10.1)
CHLORIDE SERPL-SCNC: 105 MMOL/L (ref 98–112)
CO2 SERPL-SCNC: 29 MMOL/L (ref 21–32)
CREAT BLD-MCNC: 0.73 MG/DL (ref 0.7–1.3)
CRP SERPL-MCNC: 7.52 MG/DL (ref ?–0.3)
DEPRECATED HBV CORE AB SER IA-ACNC: 172.4 NG/ML (ref 30–530)
DEPRECATED RDW RBC AUTO: 42.6 FL (ref 35.1–46.3)
EOSINOPHIL # BLD AUTO: 0 X10(3) UL (ref 0–0.7)
EOSINOPHIL NFR BLD AUTO: 0 %
ERYTHROCYTE [DISTWIDTH] IN BLOOD BY AUTOMATED COUNT: 12.3 % (ref 11–15)
GLOBULIN PLAS-MCNC: 3.4 G/DL (ref 2.8–4.4)
GLUCOSE BLD-MCNC: 236 MG/DL (ref 70–99)
GLUCOSE BLD-MCNC: 249 MG/DL (ref 70–99)
GLUCOSE BLD-MCNC: 275 MG/DL (ref 70–99)
GLUCOSE BLD-MCNC: 289 MG/DL (ref 70–99)
GLUCOSE BLD-MCNC: 330 MG/DL (ref 70–99)
HCT VFR BLD AUTO: 36.2 % (ref 39–53)
HGB BLD-MCNC: 11.8 G/DL (ref 13–17.5)
IMM GRANULOCYTES # BLD AUTO: 0.02 X10(3) UL (ref 0–1)
IMM GRANULOCYTES NFR BLD: 0.3 %
LDH SERPL L TO P-CCNC: 207 U/L
LYMPHOCYTES # BLD AUTO: 0.86 X10(3) UL (ref 1–4)
LYMPHOCYTES NFR BLD AUTO: 13.3 %
M PROTEIN MFR SERPL ELPH: 5.6 G/DL (ref 6.4–8.2)
MCH RBC QN AUTO: 30.6 PG (ref 26–34)
MCHC RBC AUTO-ENTMCNC: 32.6 G/DL (ref 31–37)
MCV RBC AUTO: 94 FL (ref 80–100)
MONOCYTES # BLD AUTO: 0.45 X10(3) UL (ref 0.1–1)
MONOCYTES NFR BLD AUTO: 6.9 %
NEUTROPHILS # BLD AUTO: 5.15 X10 (3) UL (ref 1.5–7.7)
NEUTROPHILS # BLD AUTO: 5.15 X10(3) UL (ref 1.5–7.7)
NEUTROPHILS NFR BLD AUTO: 79.5 %
OSMOLALITY SERPL CALC.SUM OF ELEC: 296 MOSM/KG (ref 275–295)
PLATELET # BLD AUTO: 277 10(3)UL (ref 150–450)
POTASSIUM SERPL-SCNC: 4.2 MMOL/L (ref 3.5–5.1)
RBC # BLD AUTO: 3.85 X10(6)UL (ref 3.8–5.8)
SODIUM SERPL-SCNC: 137 MMOL/L (ref 136–145)
WBC # BLD AUTO: 6.5 X10(3) UL (ref 4–11)

## 2020-08-18 RX ORDER — AMLODIPINE BESYLATE AND BENAZEPRIL HYDROCHLORIDE 5; 40 MG/1; MG/1
1 CAPSULE ORAL DAILY
Status: DISCONTINUED | OUTPATIENT
Start: 2020-08-18 | End: 2020-08-18 | Stop reason: SDUPTHER

## 2020-08-18 NOTE — PROGRESS NOTES
08/18/20 0598   Clinical Encounter Type   Visited With Patient; Family  (Patient requesting information re HPOA)   Patient's Supportive Strategies/Resources Yazidism.     Patient Spiritual Encounters   Spiritual Interventions Patient would like his daughter

## 2020-08-18 NOTE — PROGRESS NOTES
[facility] Hospitalist Progress Note     [facility]      SUBJECTIVE:  Feeling better  Still with cough  Appetite better    OBJECTIVE:  Temp:  [97.9 °F (36.6 °C)-98.8 °F (37.1 °C)] 98.4 °F (36.9 °C)  Pulse:  [] 56  Resp:  [17-32] 22  BP: (109-169)/(67 VENOUS DOPPLER LEG BILAT - DIAG IMG (CPT=93970)  COMPARISON:  None. INDICATIONS:  r/o dvt  TECHNIQUE:  Real time, grey scale, and duplex ultrasound was used to evaluate the lower extremity venous system.  B-mode two-dimensional images of the vascular struc vasculature demonstrates mild apical redistribution. Diffuse interstitial abnormalities the lungs are noted. Bibasilar consolidations are identified. No pneumothorax.          CONCLUSION:   Diffuse interstitial abnormalities and patchy consolidations in Oral, PRN  camphor-menthol (SARNA) lotion, , Topical, PRN  ergocalciferol (DRISDOL/VITAMIN D2) cap 50,000 Units, 50,000 Units, Oral, Q7 Days  Vitamin C tab 500 mg, 500 mg, Oral, BID  sodium chloride 0.9% IV bolus 500 mL, 500 mL, Intravenous, PRN  remdesivi down-trending    # + DVT in left Common femoral vein  - therapeutic lovenox     # Hyponatremia,  RESOLVED  - monitor    # Mild Anemia  - normocytic  - monitor  - no active signs of bleeding     # DM type II c/b hyperglycemia  - secondary to steroids, on PO

## 2020-08-18 NOTE — PROGRESS NOTES
[address] [facility]  TEL: [phone]  FAX: [phone]    Elisabeth Pruett Patient Status:  Inpatient    1950 MRN [id number]   [facility] 4SW-A Attending Linda Sunshine MD   [facility] Day # 4 PCP Florinda Maki, 282.4 172.4    204  --  210 207   DDIMER 1.71* 1.43* 0.74*  --   --            Microbiology    Reviewed in EMR,   Hospital Encounter on 08/14/20   1.  BLOOD CULTURE     Status: None (Preliminary result)    Collection Time: 08/14/20  5:42 PM   Result

## 2020-08-18 NOTE — PLAN OF CARE
Had a good appetite yesterday, noticed he had a lot of sweets/chocolates at the bedside, diabetic education about diet given. Accucheck at HS was 445, informed Hospitalist on call with orders. Gave 25 units of Novolog Sub q last night.  Serum blood sugar th

## 2020-08-18 NOTE — PROGRESS NOTES
Critical Care Progress Note     Assessment / Plan:  1.  Acute hypoxemic respiratory failure - secondary to COVID 19 pneumonia  -wean O2 as able   -ID following  -dexamethasone  -remdesivir  -s/p convalescent plasma  -monitoring off antibiotics  -follow infl

## 2020-08-18 NOTE — PLAN OF CARE
Patient received alert and oriented on 3L/NC. Patient weaned to 1L/NC. Up to chair today for meals. Awaiting transfer to F. Spoke with daughter, updated and agreeable with plan of care. Please refer to nursing assessments. Will continue to monitor.

## 2020-08-19 LAB
ALBUMIN SERPL-MCNC: 2.2 G/DL (ref 3.4–5)
ALBUMIN/GLOB SERPL: 0.6 {RATIO} (ref 1–2)
ALP LIVER SERPL-CCNC: 60 U/L (ref 45–117)
ALT SERPL-CCNC: 69 U/L (ref 16–61)
ANION GAP SERPL CALC-SCNC: 3 MMOL/L (ref 0–18)
APTT PPP: 30.8 SECONDS (ref 25.4–36.1)
AST SERPL-CCNC: 26 U/L (ref 15–37)
BASOPHILS # BLD AUTO: 0.01 X10(3) UL (ref 0–0.2)
BASOPHILS NFR BLD AUTO: 0.1 %
BILIRUB SERPL-MCNC: 0.4 MG/DL (ref 0.1–2)
BUN BLD-MCNC: 17 MG/DL (ref 7–18)
BUN/CREAT SERPL: 25.8 (ref 10–20)
CALCIUM BLD-MCNC: 8.5 MG/DL (ref 8.5–10.1)
CHLORIDE SERPL-SCNC: 104 MMOL/L (ref 98–112)
CO2 SERPL-SCNC: 29 MMOL/L (ref 21–32)
CREAT BLD-MCNC: 0.66 MG/DL (ref 0.7–1.3)
CRP SERPL-MCNC: 4.23 MG/DL (ref ?–0.3)
D-DIMER: 0.75 UG/ML FEU (ref ?–0.69)
DEPRECATED HBV CORE AB SER IA-ACNC: 176.9 NG/ML (ref 30–530)
DEPRECATED RDW RBC AUTO: 40.8 FL (ref 35.1–46.3)
EOSINOPHIL # BLD AUTO: 0 X10(3) UL (ref 0–0.7)
EOSINOPHIL NFR BLD AUTO: 0 %
ERYTHROCYTE [DISTWIDTH] IN BLOOD BY AUTOMATED COUNT: 12.3 % (ref 11–15)
GLOBULIN PLAS-MCNC: 3.6 G/DL (ref 2.8–4.4)
GLUCOSE BLD-MCNC: 164 MG/DL (ref 70–99)
GLUCOSE BLD-MCNC: 170 MG/DL (ref 70–99)
GLUCOSE BLD-MCNC: 186 MG/DL (ref 70–99)
GLUCOSE BLD-MCNC: 233 MG/DL (ref 70–99)
GLUCOSE BLD-MCNC: 345 MG/DL (ref 70–99)
HAV IGM SER QL: 2.1 MG/DL (ref 1.6–2.6)
HCT VFR BLD AUTO: 34.2 % (ref 39–53)
HGB BLD-MCNC: 12.4 G/DL (ref 13–17.5)
IMM GRANULOCYTES # BLD AUTO: 0.05 X10(3) UL (ref 0–1)
IMM GRANULOCYTES NFR BLD: 0.7 %
INR BLD: 1.16 (ref 0.89–1.11)
LDH SERPL L TO P-CCNC: 180 U/L
LYMPHOCYTES # BLD AUTO: 1.22 X10(3) UL (ref 1–4)
LYMPHOCYTES NFR BLD AUTO: 16.6 %
M PROTEIN MFR SERPL ELPH: 5.8 G/DL (ref 6.4–8.2)
MCH RBC QN AUTO: 33.9 PG (ref 26–34)
MCHC RBC AUTO-ENTMCNC: 36.3 G/DL (ref 31–37)
MCV RBC AUTO: 93.4 FL (ref 80–100)
MONOCYTES # BLD AUTO: 0.43 X10(3) UL (ref 0.1–1)
MONOCYTES NFR BLD AUTO: 5.9 %
NEUTROPHILS # BLD AUTO: 5.64 X10 (3) UL (ref 1.5–7.7)
NEUTROPHILS # BLD AUTO: 5.64 X10(3) UL (ref 1.5–7.7)
NEUTROPHILS NFR BLD AUTO: 76.7 %
OSMOLALITY SERPL CALC.SUM OF ELEC: 288 MOSM/KG (ref 275–295)
PHOSPHATE SERPL-MCNC: 3.6 MG/DL (ref 2.5–4.9)
PLATELET # BLD AUTO: 296 10(3)UL (ref 150–450)
POTASSIUM SERPL-SCNC: 4 MMOL/L (ref 3.5–5.1)
PSA SERPL DL<=0.01 NG/ML-MCNC: 15.2 SECONDS (ref 12.4–14.6)
RBC # BLD AUTO: 3.66 X10(6)UL (ref 3.8–5.8)
SODIUM SERPL-SCNC: 136 MMOL/L (ref 136–145)
WBC # BLD AUTO: 7.4 X10(3) UL (ref 4–11)

## 2020-08-19 RX ORDER — ALPRAZOLAM 0.25 MG/1
0.25 TABLET ORAL 2 TIMES DAILY
Status: DISCONTINUED | OUTPATIENT
Start: 2020-08-19 | End: 2020-08-21

## 2020-08-19 RX ORDER — ALPRAZOLAM 0.25 MG/1
0.25 TABLET ORAL DAILY PRN
Status: DISCONTINUED | OUTPATIENT
Start: 2020-08-19 | End: 2020-08-21

## 2020-08-19 NOTE — PROGRESS NOTES
[address] [facility]  TEL: [phone]  FAX: [phone]    Genet Stevens Patient Status:  Inpatient    1950 MRN [id number]   [facility] 4SW-A Attending Tej Dalal MD   [facility] Day # 5 ESTEFANY Patel 289.4  --  282.4 172.4 176.9     --  210 207 180   DDIMER 1.43* 0.74*  --   --  0.75*           Microbiology    Reviewed in EMR,   Hospital Encounter on 08/14/20   1.  BLOOD CULTURE     Status: None (Preliminary result)    Collection Time: 08/14/20

## 2020-08-19 NOTE — PROGRESS NOTES
[facility] Hospitalist Progress Note     [facility]      SUBJECTIVE:  O2 requirement 1-2 L NC  Did desaturate when dropped to room air  Still tachypneic at times  Very upset overnight with moving rooms and air unit in room    OBJECTIVE:  Temp:  [97.8 °F (3 249* 275* 164*     Imaging:    Us Venous Doppler Leg Bilat - Diag Img (cpt=93970)    Result Date: 8/15/2020  PROCEDURE:  US VENOUS DOPPLER LEG BILAT - DIAG IMG (CPT=93970)  COMPARISON:  None.   INDICATIONS:  r/o dvt  TECHNIQUE:  Real time, grey scale, and d was treated with tylenol prior to patient leaving for [place]     FINDINGS:   Enlarged cardiac silhouette is noted. Pulmonary vasculature demonstrates mild apical redistribution. Diffuse interstitial abnormalities the lungs are noted.   Bibasilar consolida Or  morphINE sulfate (PF) 4 MG/ML injection 1 mg, 1 mg, Intravenous, Q2H PRN    Or  morphINE sulfate (PF) 4 MG/ML injection 2 mg, 2 mg, Intravenous, Q2H PRN  benzocaine-menthol (CEPACOL (SUGAR-FREE)) 1 lozenge, 1 lozenge, Oral, PRN  camphor-menthol (SARNA) -- CRP > 30, ferritin and LDH ok --> CRP down-trending    # Anxiety   - patient very anxious and agitated overnight  - discussed with patient also with daughter multiple times via phone  - will give anti-anxiety meds today / tonight scheduled (unless patie

## 2020-08-19 NOTE — PROGRESS NOTES
Critical Care Progress Note     Assessment / Plan:  1.  Acute hypoxemic respiratory failure - secondary to COVID 19 pneumonia  -wean O2 as able, on 1lpm  -ID following  -dexamethasone to complete 10 days or discharge, which ever is sooner  -s/p remdesivir c

## 2020-08-19 NOTE — PLAN OF CARE
Assumed patient care at 0100. A&Ox4, anxious and agitated d/t the late room change and the loudness of the negative pressure machine- xanax PRN given, sleep kit provided. On 2L of oxygen, lungs diminished. Has mild SOB with exertion.  Dry, non-productive co Assess and instruct to report SOB or any respiratory difficulty  - Respiratory Therapy support as indicated  - Manage/alleviate anxiety  - Monitor for signs/symptoms of CO2 retention  Outcome: Progressing

## 2020-08-19 NOTE — PROGRESS NOTES
Pt is a 70 y/o male admitted with resp failure due to COVID pna.alert oriented with mild anxiety. doesnt want to stay in the room due to the noice of negative pressure machine. on 1l02 VIA nc.02 SATS>93%. LLE DVT on lovenox. no fever,pain,sob.n/v or diarrhea n

## 2020-08-20 LAB
ALBUMIN SERPL-MCNC: 2.2 G/DL (ref 3.4–5)
ALBUMIN/GLOB SERPL: 0.6 {RATIO} (ref 1–2)
ALP LIVER SERPL-CCNC: 64 U/L (ref 45–117)
ALT SERPL-CCNC: 76 U/L (ref 16–61)
ANION GAP SERPL CALC-SCNC: 2 MMOL/L (ref 0–18)
AST SERPL-CCNC: 24 U/L (ref 15–37)
BASOPHILS # BLD AUTO: 0.01 X10(3) UL (ref 0–0.2)
BASOPHILS NFR BLD AUTO: 0.1 %
BILIRUB SERPL-MCNC: 0.4 MG/DL (ref 0.1–2)
BUN BLD-MCNC: 19 MG/DL (ref 7–18)
BUN/CREAT SERPL: 25.7 (ref 10–20)
CALCIUM BLD-MCNC: 8.6 MG/DL (ref 8.5–10.1)
CHLORIDE SERPL-SCNC: 106 MMOL/L (ref 98–112)
CO2 SERPL-SCNC: 28 MMOL/L (ref 21–32)
CREAT BLD-MCNC: 0.74 MG/DL (ref 0.7–1.3)
CRP SERPL-MCNC: 2.63 MG/DL (ref ?–0.3)
D-DIMER: 0.91 UG/ML FEU (ref ?–0.69)
DEPRECATED HBV CORE AB SER IA-ACNC: 165.8 NG/ML (ref 30–530)
DEPRECATED RDW RBC AUTO: 41.1 FL (ref 35.1–46.3)
EOSINOPHIL # BLD AUTO: 0.05 X10(3) UL (ref 0–0.7)
EOSINOPHIL NFR BLD AUTO: 0.6 %
ERYTHROCYTE [DISTWIDTH] IN BLOOD BY AUTOMATED COUNT: 12.4 % (ref 11–15)
GLOBULIN PLAS-MCNC: 3.6 G/DL (ref 2.8–4.4)
GLUCOSE BLD-MCNC: 134 MG/DL (ref 70–99)
GLUCOSE BLD-MCNC: 138 MG/DL (ref 70–99)
GLUCOSE BLD-MCNC: 197 MG/DL (ref 70–99)
GLUCOSE BLD-MCNC: 199 MG/DL (ref 70–99)
GLUCOSE BLD-MCNC: 377 MG/DL (ref 70–99)
HCT VFR BLD AUTO: 36.5 % (ref 39–53)
HGB BLD-MCNC: 12.4 G/DL (ref 13–17.5)
IMM GRANULOCYTES # BLD AUTO: 0.1 X10(3) UL (ref 0–1)
IMM GRANULOCYTES NFR BLD: 1.1 %
LDH SERPL L TO P-CCNC: 178 U/L
LYMPHOCYTES # BLD AUTO: 1.92 X10(3) UL (ref 1–4)
LYMPHOCYTES NFR BLD AUTO: 21.3 %
M PROTEIN MFR SERPL ELPH: 5.8 G/DL (ref 6.4–8.2)
MCH RBC QN AUTO: 31.3 PG (ref 26–34)
MCHC RBC AUTO-ENTMCNC: 34 G/DL (ref 31–37)
MCV RBC AUTO: 92.2 FL (ref 80–100)
MONOCYTES # BLD AUTO: 0.67 X10(3) UL (ref 0.1–1)
MONOCYTES NFR BLD AUTO: 7.4 %
NEUTROPHILS # BLD AUTO: 6.27 X10 (3) UL (ref 1.5–7.7)
NEUTROPHILS # BLD AUTO: 6.27 X10(3) UL (ref 1.5–7.7)
NEUTROPHILS NFR BLD AUTO: 69.5 %
OSMOLALITY SERPL CALC.SUM OF ELEC: 286 MOSM/KG (ref 275–295)
PLATELET # BLD AUTO: 320 10(3)UL (ref 150–450)
POTASSIUM SERPL-SCNC: 4.1 MMOL/L (ref 3.5–5.1)
RBC # BLD AUTO: 3.96 X10(6)UL (ref 3.8–5.8)
SODIUM SERPL-SCNC: 136 MMOL/L (ref 136–145)
WBC # BLD AUTO: 9 X10(3) UL (ref 4–11)

## 2020-08-20 NOTE — PLAN OF CARE
A/O x4. Vital signs stable. Tolerating 0.5L with saturations of >92%, will wean as able. IS encouraged. Self-prones. Tele-NSR. Voiding freely. No complaints of pain. Up with standby assistance. PO decadron. Safety precautions in place.  Pt and daughter Jerrod Kelloggver ABGs  - Provide Smoking Cessation handout, if applicable  - Encourage broncho-pulmonary hygiene including cough, deep breathe, Incentive Spirometry  - Assess the need for suctioning and perform as needed  - Assess and instruct to report SOB or any respirat

## 2020-08-20 NOTE — PLAN OF CARE
SPO2% on Room Air at rest: 94%    SPO2% Ambulation on Room Air: 88%    SPO2% Ambulation on O2: 92% on 2 Liters per minute

## 2020-08-20 NOTE — PLAN OF CARE
Patient is AO x 4. Maintains O2 sats on room air at rest all day. Patient requiring O2 with exertion in the morning. IS encouraged, patient increased activity in room. Remaining afebrile. Good appetite for meals. PO steroids, lovenox changed to PO eliquis. ABGs  - Provide Smoking Cessation handout, if applicable  - Encourage broncho-pulmonary hygiene including cough, deep breathe, Incentive Spirometry  - Assess the need for suctioning and perform as needed  - Assess and instruct to report SOB or any respirat

## 2020-08-20 NOTE — PROGRESS NOTES
Pulmonary Progress Note        NAME: Lorena Wild - ROOM: 527/527-A - MRN: [id number] - Age: 71year old - : 1950        Last 24hrs: No events overnight, wants to go home as soon as possible    OBJECTIVE:   20  2337 20 cyanosis or edema    Labs reviewed as noted below    ASSESSMENT/PLAN:  1.  Acute hypoxemic respiratory failure - secondary to COVID 19 pneumonia  -wean O2 as able, on RA at rest, needing 1-2L w/ activity  -ID following  -dexamethasone to complete 10 days or

## 2020-08-20 NOTE — PLAN OF CARE
SPO2% on Room Air at rest: 96%    SPO2% Ambulation on Room Air: 94%    SPO2% Ambulation on O2: 94% on 0 Liters per minute

## 2020-08-20 NOTE — PROGRESS NOTES
[facility] Hospitalist Progress Note     PCP: Deon Gold MD    SUBJECTIVE:  No CP, SOB, or N/V. Pt states that he is always sob with ambulation and to \"please let me go home. \"    Desats with ambulation to 88% on RA.     OBJECTIVE:  Temp:  [98 °F (36.7 °C insulin detemir  18 Units Subcutaneous Nightly   • melatonin  5 mg Oral Nightly   • ALPRAZolam  0.25 mg Oral BID   • Insulin Aspart Pen  11 Units Subcutaneous TID CC   • amLODIPine-lisinopril (LOTREL 5/40) combination tablet ([facility] only)   Oral Daily   • dex RESOLVED  - monitor     # Mild Anemia  - normocytic  - monitor  - no active signs of bleeding     # DM type II c/b hyperglycemia  - secondary to steroids, on PO meds at home  - [facility] and accuchecks, meal time coverage increased to 13U TID CC (from 11)     #

## 2020-08-20 NOTE — PROGRESS NOTES
[address] [place] [address]  TEL: [phone]  FAX: [phone]    Genet Stevens Patient Status:  Inpatient    1950 MRN [id number]   [facility] 4SW-A Attending Tej Dalal MD   [facility] Day # 6 PCP Fareed Patel CRP  --    < > 7.52* 4.23* 2.63*   CHICA  --    < > 172.4 176.9 165.8   LDH  --    < > 207 180 178   DDIMER 0.74*  --   --  0.75* 0.91*    < > = values in this interval not displayed.        Microbiology    Reviewed in EMR,   Hospital Encounter on 08/14/20

## 2020-08-21 VITALS
TEMPERATURE: 98 F | HEART RATE: 51 BPM | DIASTOLIC BLOOD PRESSURE: 83 MMHG | BODY MASS INDEX: 28.09 KG/M2 | OXYGEN SATURATION: 97 % | RESPIRATION RATE: 18 BRPM | SYSTOLIC BLOOD PRESSURE: 141 MMHG | HEIGHT: 66 IN | WEIGHT: 174.81 LBS

## 2020-08-21 LAB
ALBUMIN SERPL-MCNC: 2.3 G/DL (ref 3.4–5)
ALBUMIN/GLOB SERPL: 0.6 {RATIO} (ref 1–2)
ALP LIVER SERPL-CCNC: 80 U/L (ref 45–117)
ALT SERPL-CCNC: 83 U/L (ref 16–61)
ANION GAP SERPL CALC-SCNC: 6 MMOL/L (ref 0–18)
AST SERPL-CCNC: 32 U/L (ref 15–37)
BILIRUB SERPL-MCNC: 0.6 MG/DL (ref 0.1–2)
BUN BLD-MCNC: 19 MG/DL (ref 7–18)
BUN/CREAT SERPL: 20.7 (ref 10–20)
CALCIUM BLD-MCNC: 8.9 MG/DL (ref 8.5–10.1)
CHLORIDE SERPL-SCNC: 103 MMOL/L (ref 98–112)
CO2 SERPL-SCNC: 27 MMOL/L (ref 21–32)
CREAT BLD-MCNC: 0.92 MG/DL (ref 0.7–1.3)
GLOBULIN PLAS-MCNC: 3.7 G/DL (ref 2.8–4.4)
GLUCOSE BLD-MCNC: 200 MG/DL (ref 70–99)
GLUCOSE BLD-MCNC: 318 MG/DL (ref 70–99)
GLUCOSE BLD-MCNC: 328 MG/DL (ref 70–99)
M PROTEIN MFR SERPL ELPH: 6 G/DL (ref 6.4–8.2)
OSMOLALITY SERPL CALC.SUM OF ELEC: 297 MOSM/KG (ref 275–295)
POTASSIUM SERPL-SCNC: 4.7 MMOL/L (ref 3.5–5.1)
SODIUM SERPL-SCNC: 136 MMOL/L (ref 136–145)

## 2020-08-21 RX ORDER — DEXAMETHASONE 6 MG/1
6 TABLET ORAL NIGHTLY
Qty: 3 TABLET | Refills: 0 | Status: SHIPPED | OUTPATIENT
Start: 2020-08-21 | End: 2020-08-21

## 2020-08-21 RX ORDER — ALPRAZOLAM 0.25 MG/1
0.25 TABLET ORAL 2 TIMES DAILY
Qty: 10 TABLET | Refills: 0 | Status: SHIPPED | OUTPATIENT
Start: 2020-08-21 | End: 2020-10-26

## 2020-08-21 NOTE — PROGRESS NOTES
[address] [facility]  TEL: [phone]  FAX: [phone]    Arnav Almonte Patient Status:  Inpatient    1950 MRN [id number]   [facility] 4SW-A Attending Judith Olivera MD   [facility] Day # 7 PCP Noemi Fisher, [place]  Recent Labs   Lab 08/17/20  0427  08/18/20  0424 08/19/20  0817 08/20/20  0758   CRP  --    < > 7.52* 4.23* 2.63*   CHICA  --    < > 172.4 176.9 165.8   LDH  --    < > 207 180 178   DDIMER 0.74*  --   --  0.75* 0.91*    < > = values in this interval

## 2020-08-21 NOTE — PLAN OF CARE
NURSING DISCHARGE NOTE    Discharged Home via Wheelchair. Accompanied by RN and Support staff  Belongings Taken by patient/family. PIV removed. Discharge navigator complete. Discharge instructions reviewed with patient & family at length.  All question

## 2020-08-21 NOTE — PLAN OF CARE
A/O x4. Vital signs stable. Tolerating RA with saturations of >92%. IS encouraged. Self-prones. Tele-NSR. Voiding freely. No complaints of pain. Up with standby assistance. PO decadron. Safety precautions in place. Pt updated on plan of care. WCTM.     Prob broncho-pulmonary hygiene including cough, deep breathe, Incentive Spirometry  - Assess the need for suctioning and perform as needed  - Assess and instruct to report SOB or any respiratory difficulty  - Respiratory Therapy support as indicated  - Manage/a

## 2020-08-21 NOTE — PROGRESS NOTES
Pulmonary Progress Note        NAME: Jane Mozier - ROOM: 527/527-A - MRN: [id number] - Age: 71year old - : 1950        Last 24hrs: No events overnight, ready to go home    OBJECTIVE:   20  2330 20  0419 20  0714 20  1200 below    ASSESSMENT/PLAN:  1.  Acute hypoxemic respiratory failure - secondary to COVID 19 pneumonia  -wean O2 as able, on RA at rest, needing 1-2L w/ activity  -ID following  -dexamethasone to complete 10 days or discharge, which ever is sooner  -s/p remde

## 2020-08-21 NOTE — PLAN OF CARE
SPO2% on Room Air at rest: 96%    SPO2% Ambulation on Room Air: 93%    SPO2% Ambulation on O2: 93% on 0 Liters per minute

## 2020-08-21 NOTE — DIETARY NOTE
[facility]    NUTRITION ASSESSMENT    Pt does not meet malnutrition criteria. NUTRITION DIAGNOSIS/PROBLEM:  Altered nutrition-related lab values related to physiological causes as evidenced by A1c of 9.6% and BG levels at 302-359 mg/dl.     [facility] documentation    NUTRITION PRESCRIPTION:  Calories: 8001-0797 calories/day (25-30 calories per kg)  Protein: 119 grams protein/day (1.5 grams protein per kg)  Fluid: ~1 ml/kcal or per MD discretion    MONITOR AND EVALUATE/NUTRITION GOALS:  1. PO intake to

## 2020-08-21 NOTE — CM/SW NOTE
Plan of care reviewed for care coordination and discharge planning. Pt with COVID-19 infection which is improving, , oxygen weaned off to room air. Pt is ambulatory. S/p dexamethasone, CCP and remdesivir.   Also noted to have  LLE DVT, will need anticoagula

## 2020-08-21 NOTE — DISCHARGE SUMMARY
General Medicine Discharge Summary     Patient ID:  Kailyn Mathias  71year old  11/27/1950    Admit date: 8/14/2020    Discharge date and time: 8/21/20    Attending Physician: Pierce Denver, DO Primary up  - resume home amlodpine - benazapril     # HLD  - continue home statin     # BPH  - continue home finasteride/flomax    Consults: IP CONSULT TO PULMONOLOGY  IP CONSULT TO HOSPITALIST  IP CONSULT TO INFECTIOUS DISEASE  IP CONSULT TO SOCIAL WORK  IP CONS medical immediate care for worsening COVID 19  symptoms ~ dx was positive on Sunday ~ pt reports increasing shortness of  breath and reports that oxygen saturation was 88% on room air there which  came up to 92-95% with 2L of oxygen.  Pt also had a fever of meals. Rosuvastatin Calcium 10 MG Oral Tab  Take 1 tablet (10 mg total) by mouth nightly. Code Status: Full Code      Exam on day of discharge:      08/21/20  1200   BP:    Pulse:    Resp: 18   Temp: 98 °F (36.7 °C)       Alert oriented.

## 2020-09-17 ENCOUNTER — TELEPHONE (OUTPATIENT)
Dept: CASE MANAGEMENT | Facility: HOSPITAL | Age: 70
End: 2020-09-17

## 2020-11-04 PROBLEM — I82.412 ACUTE DEEP VEIN THROMBOSIS (DVT) OF FEMORAL VEIN OF LEFT LOWER EXTREMITY (HCC): Status: ACTIVE | Noted: 2020-11-04

## (undated) NOTE — LETTER
[address] [facility] [place] FOR BLOOD OR BLOOD COMPONENTS      In the course of your treatment, it may become necessary to administer a transfusion of blood or blood components.  This form provides basic information concerning this proc explain the alternatives to you if it has not already been done. I,Kathy Chavez, have read/had read to me the above. I understand the matters bearing on the decision whether or not to authorize a transfusion of blood or blood components.  I have no questi